# Patient Record
Sex: MALE | Race: WHITE | Employment: FULL TIME | URBAN - METROPOLITAN AREA
[De-identification: names, ages, dates, MRNs, and addresses within clinical notes are randomized per-mention and may not be internally consistent; named-entity substitution may affect disease eponyms.]

---

## 2021-12-02 ENCOUNTER — APPOINTMENT (OUTPATIENT)
Dept: GENERAL RADIOLOGY | Age: 36
End: 2021-12-02
Attending: EMERGENCY MEDICINE
Payer: COMMERCIAL

## 2021-12-02 ENCOUNTER — HOSPITAL ENCOUNTER (EMERGENCY)
Age: 36
Discharge: HOME | End: 2021-12-02
Attending: EMERGENCY MEDICINE
Payer: COMMERCIAL

## 2021-12-02 VITALS
DIASTOLIC BLOOD PRESSURE: 87 MMHG | SYSTOLIC BLOOD PRESSURE: 127 MMHG | HEART RATE: 76 BPM | BODY MASS INDEX: 20.46 KG/M2 | OXYGEN SATURATION: 95 % | HEIGHT: 68 IN | TEMPERATURE: 98.5 F | WEIGHT: 135 LBS | RESPIRATION RATE: 14 BRPM

## 2021-12-02 DIAGNOSIS — R07.89 CHEST WALL PAIN: Primary | ICD-10-CM

## 2021-12-02 DIAGNOSIS — M94.0 COSTOCHONDRITIS: ICD-10-CM

## 2021-12-02 LAB
ALBUMIN SERPL-MCNC: 4.4 G/DL (ref 3.5–5)
ALBUMIN/GLOB SERPL: 1.2 {RATIO} (ref 1.2–3.5)
ALP SERPL-CCNC: 65 U/L (ref 50–136)
ALT SERPL-CCNC: 36 U/L (ref 12–65)
ANION GAP SERPL CALC-SCNC: 7 MMOL/L (ref 7–16)
AST SERPL-CCNC: 18 U/L (ref 15–37)
BASOPHILS # BLD: 0.1 K/UL (ref 0–0.2)
BASOPHILS NFR BLD: 0 % (ref 0–2)
BILIRUB SERPL-MCNC: 0.4 MG/DL (ref 0.2–1.1)
BUN SERPL-MCNC: 15 MG/DL (ref 6–23)
CALCIUM SERPL-MCNC: 9.3 MG/DL (ref 8.3–10.4)
CHLORIDE SERPL-SCNC: 107 MMOL/L (ref 98–107)
CO2 SERPL-SCNC: 25 MMOL/L (ref 21–32)
CREAT SERPL-MCNC: 0.69 MG/DL (ref 0.8–1.5)
DIFFERENTIAL METHOD BLD: ABNORMAL
EOSINOPHIL # BLD: 0.1 K/UL (ref 0–0.8)
EOSINOPHIL NFR BLD: 0 % (ref 0.5–7.8)
ERYTHROCYTE [DISTWIDTH] IN BLOOD BY AUTOMATED COUNT: 12.1 % (ref 11.9–14.6)
GLOBULIN SER CALC-MCNC: 3.6 G/DL (ref 2.3–3.5)
GLUCOSE SERPL-MCNC: 109 MG/DL (ref 65–100)
HCT VFR BLD AUTO: 44.2 % (ref 41.1–50.3)
HGB BLD-MCNC: 14.8 G/DL (ref 13.6–17.2)
IMM GRANULOCYTES # BLD AUTO: 0 K/UL (ref 0–0.5)
IMM GRANULOCYTES NFR BLD AUTO: 0 % (ref 0–5)
LIPASE SERPL-CCNC: 84 U/L (ref 73–393)
LYMPHOCYTES # BLD: 2.2 K/UL (ref 0.5–4.6)
LYMPHOCYTES NFR BLD: 18 % (ref 13–44)
MAGNESIUM SERPL-MCNC: 2.3 MG/DL (ref 1.8–2.4)
MCH RBC QN AUTO: 30.1 PG (ref 26.1–32.9)
MCHC RBC AUTO-ENTMCNC: 33.5 G/DL (ref 31.4–35)
MCV RBC AUTO: 90 FL (ref 79.6–97.8)
MONOCYTES # BLD: 0.9 K/UL (ref 0.1–1.3)
MONOCYTES NFR BLD: 7 % (ref 4–12)
NEUTS SEG # BLD: 9.1 K/UL (ref 1.7–8.2)
NEUTS SEG NFR BLD: 74 % (ref 43–78)
NRBC # BLD: 0 K/UL (ref 0–0.2)
PLATELET # BLD AUTO: 319 K/UL (ref 150–450)
PMV BLD AUTO: 9.5 FL (ref 9.4–12.3)
POTASSIUM SERPL-SCNC: 3.7 MMOL/L (ref 3.5–5.1)
PROT SERPL-MCNC: 8 G/DL (ref 6.3–8.2)
RBC # BLD AUTO: 4.91 M/UL (ref 4.23–5.6)
SODIUM SERPL-SCNC: 139 MMOL/L (ref 138–145)
TROPONIN-HIGH SENSITIVITY: 3.3 PG/ML (ref 0–14)
TROPONIN-HIGH SENSITIVITY: <3 PG/ML (ref 0–14)
WBC # BLD AUTO: 12.3 K/UL (ref 4.3–11.1)

## 2021-12-02 PROCEDURE — 93005 ELECTROCARDIOGRAM TRACING: CPT | Performed by: EMERGENCY MEDICINE

## 2021-12-02 PROCEDURE — 85025 COMPLETE CBC W/AUTO DIFF WBC: CPT

## 2021-12-02 PROCEDURE — 96374 THER/PROPH/DIAG INJ IV PUSH: CPT

## 2021-12-02 PROCEDURE — 74011250636 HC RX REV CODE- 250/636: Performed by: EMERGENCY MEDICINE

## 2021-12-02 PROCEDURE — 94762 N-INVAS EAR/PLS OXIMTRY CONT: CPT

## 2021-12-02 PROCEDURE — 96375 TX/PRO/DX INJ NEW DRUG ADDON: CPT

## 2021-12-02 PROCEDURE — 71046 X-RAY EXAM CHEST 2 VIEWS: CPT

## 2021-12-02 PROCEDURE — 83690 ASSAY OF LIPASE: CPT

## 2021-12-02 PROCEDURE — 84484 ASSAY OF TROPONIN QUANT: CPT

## 2021-12-02 PROCEDURE — 99285 EMERGENCY DEPT VISIT HI MDM: CPT

## 2021-12-02 PROCEDURE — 83735 ASSAY OF MAGNESIUM: CPT

## 2021-12-02 PROCEDURE — 80053 COMPREHEN METABOLIC PANEL: CPT

## 2021-12-02 RX ORDER — SODIUM CHLORIDE 0.9 % (FLUSH) 0.9 %
5-10 SYRINGE (ML) INJECTION AS NEEDED
Status: DISCONTINUED | OUTPATIENT
Start: 2021-12-02 | End: 2021-12-03 | Stop reason: HOSPADM

## 2021-12-02 RX ORDER — PREDNISONE 50 MG/1
50 TABLET ORAL DAILY
Qty: 3 TABLET | Refills: 0 | Status: SHIPPED | OUTPATIENT
Start: 2021-12-02 | End: 2021-12-05

## 2021-12-02 RX ORDER — NAPROXEN 500 MG/1
500 TABLET ORAL 2 TIMES DAILY WITH MEALS
Qty: 20 TABLET | Refills: 0 | Status: SHIPPED | OUTPATIENT
Start: 2021-12-02 | End: 2021-12-12

## 2021-12-02 RX ORDER — DEXAMETHASONE SODIUM PHOSPHATE 100 MG/10ML
10 INJECTION INTRAMUSCULAR; INTRAVENOUS
Status: COMPLETED | OUTPATIENT
Start: 2021-12-02 | End: 2021-12-02

## 2021-12-02 RX ORDER — KETOROLAC TROMETHAMINE 30 MG/ML
30 INJECTION, SOLUTION INTRAMUSCULAR; INTRAVENOUS
Status: COMPLETED | OUTPATIENT
Start: 2021-12-02 | End: 2021-12-02

## 2021-12-02 RX ORDER — SODIUM CHLORIDE 0.9 % (FLUSH) 0.9 %
5-10 SYRINGE (ML) INJECTION EVERY 8 HOURS
Status: DISCONTINUED | OUTPATIENT
Start: 2021-12-02 | End: 2021-12-03 | Stop reason: HOSPADM

## 2021-12-02 RX ADMIN — SODIUM CHLORIDE 1000 ML: 900 INJECTION, SOLUTION INTRAVENOUS at 21:25

## 2021-12-02 RX ADMIN — DEXAMETHASONE SODIUM PHOSPHATE 10 MG: 10 INJECTION INTRAMUSCULAR; INTRAVENOUS at 21:25

## 2021-12-02 RX ADMIN — KETOROLAC TROMETHAMINE 30 MG: 30 INJECTION, SOLUTION INTRAMUSCULAR; INTRAVENOUS at 21:25

## 2021-12-02 NOTE — ED TRIAGE NOTES
Pt presents with left sided chest pain intermittent over the last several days. States today had an episode where he felt dizzy and his limbs were heavy.

## 2021-12-03 LAB
ATRIAL RATE: 113 BPM
CALCULATED P AXIS, ECG09: 70 DEGREES
CALCULATED R AXIS, ECG10: 80 DEGREES
CALCULATED T AXIS, ECG11: 55 DEGREES
DIAGNOSIS, 93000: NORMAL
P-R INTERVAL, ECG05: 184 MS
Q-T INTERVAL, ECG07: 322 MS
QRS DURATION, ECG06: 90 MS
QTC CALCULATION (BEZET), ECG08: 441 MS
VENTRICULAR RATE, ECG03: 113 BPM

## 2021-12-03 NOTE — ED NOTES
I have reviewed discharge instructions with the patient. The patient verbalized understanding. Patient left ED via Discharge Method: ambulatory to Home   Opportunity for questions and clarification provided. Patient given 0 scripts. To continue your aftercare when you leave the hospital, you may receive an automated call from our care team to check in on how you are doing. This is a free service and part of our promise to provide the best care and service to meet your aftercare needs.  If you have questions, or wish to unsubscribe from this service please call [phone]. Thank you for Choosing our [address] [place] [address] Emergency Department.

## 2021-12-03 NOTE — ED PROVIDER NOTES
J Luis Lancaster is a 31 y.o. male seen on 12/2/2021 in the [facility] EMERGENCY DEPT in room ER30/30. Chief Complaint   Patient presents with    Chest Pain     HPI:   19-year-old  male presented to the emergency department with complaints of intermittent left-sided chest pain that has been going on for the past few weeks. Patient denies any specific exacerbating or alleviating factors to his chest pain. He states that it is located in the left side of his chest just left of patient's sternum. He describes it as sharp in nature. It comes and goes. Patient did have an episode earlier today where he felt like his heart flutter and he subsequently became lightheaded and then started breathing fast and started feeling numbness and tingling in his fingers. 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 Patient does not have direct familial heart disease history however his grandparents did have heart disease. Patient denies any fevers, chills, nausea, vomiting, shortness of breath or any other concerns. Historian: Patient    REVIEW OF SYSTEMS     Review of Systems   Constitutional: Negative. HENT: Negative. Respiratory: Negative. Cardiovascular: Positive for chest pain and palpitations. Gastrointestinal: Negative. Genitourinary: Negative. Musculoskeletal: Negative. Skin: Negative. Neurological: Negative. Psychiatric/Behavioral: Negative. All other systems reviewed and are negative. PAST MEDICAL HISTORY     No past medical history on file. No past surgical history on file.   Social History     Socioeconomic History    Marital status: SINGLE     None     Allergies   Allergen Reactions    Ceclor [Cefaclor] Rash        PHYSICAL EXAM       Vitals:    12/02/21 2115 12/02/21 2130 12/02/21 2145 12/02/21 2200   BP: (!) 131/90 132/88 130/84 131/82   Pulse: 85 88 79 80   Resp: 17  18 16   Temp:       SpO2: 95% 97% 96% 99%    Vital signs were reviewed. Physical Exam  Vitals and nursing note reviewed. Constitutional:       General: He is not in acute distress. Appearance: He is well-developed. He is not ill-appearing or toxic-appearing. HENT:      Head: Normocephalic and atraumatic. Eyes:      Extraocular Movements: Extraocular movements intact. Pupils: Pupils are equal, round, and reactive to light. Cardiovascular:      Rate and Rhythm: Normal rate and regular rhythm. Heart sounds: Normal heart sounds. Pulmonary:      Effort: Pulmonary effort is normal.      Breath sounds: Normal breath sounds. Chest:      Chest wall: Tenderness (Point tenderness to the left sternocostal border of the mid to lower left chest) present. Abdominal:      Palpations: Abdomen is soft. There is no mass. Tenderness: There is no abdominal tenderness. Musculoskeletal:         General: Normal range of motion. Cervical back: Normal range of motion. Right lower leg: No tenderness. No edema. Left lower leg: No tenderness. No edema. Neurological:      General: No focal deficit present. Mental Status: He is alert and oriented to person, place, and time.    Psychiatric:         Mood and Affect: Mood normal.         Behavior: Behavior normal.          MEDICAL DECISION MAKING     ED Course:    Orders Placed This Encounter    XR Chest PA LAT (check if patient is in hallway or waiting room)    Troponin - High Sensitivity    Troponin 2 Hour Repeat    CBC    CMP    LIPASE    Magnesium    Cardiac Monitoring    PULSE OXIMETRY CONTINUOUS    NURSING-MISCELLANEOUS: Please draw blue top tube and send to lab ONE TIME    EKG    SALINE LOCK IV ONE TIME Routine    INSERT PERIPHERAL IV ONE TIME STAT    sodium chloride (NS) flush 5-10 mL    sodium chloride (NS) flush 5-10 mL    ketorolac (TORADOL) injection 30 mg    dexamethasone (DECADRON) 10 mg/mL injection 10 mg    sodium chloride 0.9 % bolus infusion 1,000 mL    naproxen (Naprosyn) 500 mg tablet    predniSONE (DELTASONE) 50 mg tablet     Recent Results (from the past 8 hour(s))   EKG, 12 LEAD, INITIAL    Collection Time: 12/02/21  6:51 PM   Result Value Ref Range    Ventricular Rate 113 BPM    Atrial Rate 113 BPM    P-R Interval 184 ms    QRS Duration 90 ms    Q-T Interval 322 ms    QTC Calculation (Bezet) 441 ms    Calculated P Axis 70 degrees    Calculated R Axis 80 degrees    Calculated T Axis 55 degrees    Diagnosis       Sinus tachycardia  Otherwise normal ECG  When compared with ECG of 02-DEC-2021 18:48,  No significant change was found     TROPONIN-HIGH SENSITIVITY    Collection Time: 12/02/21  7:09 PM   Result Value Ref Range    Troponin-High Sensitivity <3.0 0 - 14 pg/mL   CBC WITH AUTOMATED DIFF    Collection Time: 12/02/21  7:09 PM   Result Value Ref Range    WBC 12.3 (H) 4.3 - 11.1 K/uL    RBC 4.91 4.23 - 5.6 M/uL    HGB 14.8 13.6 - 17.2 g/dL    HCT 44.2 41.1 - 50.3 %    MCV 90.0 79.6 - 97.8 FL    MCH 30.1 26.1 - 32.9 PG    MCHC 33.5 31.4 - 35.0 g/dL    RDW 12.1 11.9 - 14.6 %    PLATELET 521 202 - 129 K/uL    MPV 9.5 9.4 - 12.3 FL    ABSOLUTE NRBC 0.00 0.0 - 0.2 K/uL    DF AUTOMATED      NEUTROPHILS 74 43 - 78 %    LYMPHOCYTES 18 13 - 44 %    MONOCYTES 7 4.0 - 12.0 %    EOSINOPHILS 0 (L) 0.5 - 7.8 %    BASOPHILS 0 0.0 - 2.0 %    IMMATURE GRANULOCYTES 0 0.0 - 5.0 %    ABS. NEUTROPHILS 9.1 (H) 1.7 - 8.2 K/UL    ABS. LYMPHOCYTES 2.2 0.5 - 4.6 K/UL    ABS. MONOCYTES 0.9 0.1 - 1.3 K/UL    ABS. EOSINOPHILS 0.1 0.0 - 0.8 K/UL    ABS. BASOPHILS 0.1 0.0 - 0.2 K/UL    ABS. IMM.  GRANS. 0.0 0.0 - 0.5 K/UL   METABOLIC PANEL, COMPREHENSIVE    Collection Time: 12/02/21  7:09 PM   Result Value Ref Range    Sodium 139 138 - 145 mmol/L    Potassium 3.7 3.5 - 5.1 mmol/L    Chloride 107 98 - 107 mmol/L    CO2 25 21 - 32 mmol/L    Anion gap 7 7 - 16 mmol/L    Glucose 109 (H) 65 - 100 mg/dL BUN 15 6 - 23 MG/DL    Creatinine 0.69 (L) 0.8 - 1.5 MG/DL    GFR est AA >60 >60 ml/min/1.73m2    GFR est non-AA >60 >60 ml/min/1.73m2    Calcium 9.3 8.3 - 10.4 MG/DL    Bilirubin, total 0.4 0.2 - 1.1 MG/DL    ALT (SGPT) 36 12 - 65 U/L    AST (SGOT) 18 15 - 37 U/L    Alk. phosphatase 65 50 - 136 U/L    Protein, total 8.0 6.3 - 8.2 g/dL    Albumin 4.4 3.5 - 5.0 g/dL    Globulin 3.6 (H) 2.3 - 3.5 g/dL    A-G Ratio 1.2 1.2 - 3.5     LIPASE    Collection Time: 12/02/21  7:09 PM   Result Value Ref Range    Lipase 84 73 - 393 U/L   MAGNESIUM    Collection Time: 12/02/21  7:09 PM   Result Value Ref Range    Magnesium 2.3 1.8 - 2.4 mg/dL   TROPONIN-HIGH SENSITIVITY    Collection Time: 12/02/21  9:31 PM   Result Value Ref Range    Troponin-High Sensitivity 3.3 0 - 14 pg/mL     XR CHEST PA LAT    Result Date: 12/2/2021  CHEST X-RAY, 2 views. INDICATION:  Chest pain. TECHNIQUE: PA and lateral views. COMPARISON: None. FINDINGS: Lungs: are clear. Costophrenic angles: are sharp. Heart size: is normal. Pulmonary vasculature: is unremarkable. Aorta: Unremarkable. Included portion of the upper abdomen: is unremarkable. Bones: No gross bony lesions. Other: None. Negative for acute abnormality. EKG interpretation personally: Rate 113. Sinus tachycardia. Normal RI and QT intervals. No ischemic changes. ED Course as of 12/02/21 2252   Thu Dec 02, 2021   2244 Patient is feeling much better. Troponins negative x2. Patient be discharged home. [JL]      ED Course User Index  [JL] DO HOUSTON Henley  Number of Diagnoses or Management Options  Diagnosis management comments: 30-year-old  male presented to emergency department with complaints of chest pain that been ongoing intermittently for the past few weeks. Patient's chest pain is consistent with musculoskeletal chest pain that is completely reproducible over the left sternocostal border.   When discussing patient's diagnosis he states that he is actually been sleeping on the floor for the past month and ordered a new mattress that comes tomorrow. This could be patient's etiology of his chest wall pain. Patient will be discharged home return department for any concerns. Amount and/or Complexity of Data Reviewed  Clinical lab tests: reviewed  Tests in the radiology section of CPT®: reviewed  Independent visualization of images, tracings, or specimens: yes    Patient Progress  Patient progress: improved        Disposition: Discharged  Diagnosis:     ICD-10-CM ICD-9-CM   1. Chest wall pain  R07.89 786.52   2. Costochondritis  M94.0 733.6     ____________________________________________________________________  A portion of this note was generated using voice recognition dictation software. While the note has been reviewed for accuracy, please note certain words and phrases may not be transcribed as intended and some grammatical and/or typographical errors may be present.

## 2022-09-26 ENCOUNTER — OFFICE VISIT (OUTPATIENT)
Dept: GASTROENTEROLOGY | Age: 37
End: 2022-09-26
Payer: COMMERCIAL

## 2022-09-26 VITALS
BODY MASS INDEX: 23.04 KG/M2 | HEIGHT: 68 IN | OXYGEN SATURATION: 94 % | WEIGHT: 152 LBS | SYSTOLIC BLOOD PRESSURE: 132 MMHG | HEART RATE: 81 BPM | DIASTOLIC BLOOD PRESSURE: 93 MMHG | TEMPERATURE: 97.2 F

## 2022-09-26 DIAGNOSIS — K52.9 CHRONIC DIARRHEA OF UNKNOWN ORIGIN: ICD-10-CM

## 2022-09-26 DIAGNOSIS — K52.9 CHRONIC DIARRHEA OF UNKNOWN ORIGIN: Primary | ICD-10-CM

## 2022-09-26 LAB
CRP SERPL-MCNC: <0.3 MG/DL (ref 0–0.9)
ERYTHROCYTE [SEDIMENTATION RATE] IN BLOOD: 5 MM/HR (ref 0–20)

## 2022-09-26 PROCEDURE — 99203 OFFICE O/P NEW LOW 30 MIN: CPT | Performed by: INTERNAL MEDICINE

## 2022-09-26 RX ORDER — FEXOFENADINE HYDROCHLORIDE 180 MG/1
TABLET, FILM COATED ORAL
COMMUNITY
Start: 2022-09-21

## 2022-09-26 ASSESSMENT — ENCOUNTER SYMPTOMS
COLOR CHANGE: 0
SHORTNESS OF BREATH: 0
TROUBLE SWALLOWING: 0
BLOOD IN STOOL: 0
VOMITING: 0
ABDOMINAL PAIN: 1
DIARRHEA: 1

## 2022-09-26 NOTE — PROGRESS NOTES
Ulises Dorsey (:  1985) is a 30 y.o. male, new patient here for evaluation of the following chief complaint(s):  Abdominal Pain (Sx for 2 months ), Other (Hungry at night time /Bloating for a while hungry but cant eat/ gas ), and Anemia (Dark [place] around eyes/ low energy )         ASSESSMENT/PLAN:  1. Chronic diarrhea of unknown origin  -     Calprotectin Stool; Future  -     Celiac Ab tTg DGP TIgA; Future  -     C-Reactive Protein; Future  -     Sedimentation Rate; Future  -     Ova and Parasite Examination; Future           Subjective   SUBJECTIVE/OBJECTIVE  Patient presents with recurrent episode of periumbilical abdominal discomfort moderate associated with change in bowel habits mostly on the looser diarrhea side. He said that he does a lot of traveling and his girlfriend had a parasitic infection. He denied any rectal bleeding fever chills weight loss. No family history of inflammatory bowel disease. He denied any food allergies. No past medical history on file. No past surgical history on file. Allergies   Allergen Reactions    Cefaclor Rash          Review of Systems   Constitutional:  Negative for appetite change. HENT:  Negative for mouth sores and trouble swallowing. Respiratory:  Negative for shortness of breath. Cardiovascular:  Negative for chest pain. Gastrointestinal:  Positive for abdominal pain and diarrhea. Negative for blood in stool and vomiting. Skin:  Negative for color change. Allergic/Immunologic: Negative for food allergies. Neurological:  Negative for seizures and weakness. Hematological:  Does not bruise/bleed easily. Objective   Physical Exam  HENT:      Head: Normocephalic. Mouth/Throat:      Mouth: Mucous membranes are moist.   Eyes:      General: No scleral icterus. Cardiovascular:      Rate and Rhythm: Normal rate and regular rhythm. Pulmonary:      Effort: No respiratory distress.    Abdominal:      General: There is no distension. Tenderness: There is no abdominal tenderness. There is no rebound. Lymphadenopathy:      Cervical: No cervical adenopathy. Skin:     Coloration: Skin is not jaundiced. Findings: No bruising. Neurological:      General: No focal deficit present. Mental Status: He is alert. Current Outpatient Medications   Medication Sig Dispense Refill    ALLERGY RELIEF 180 MG tablet TAKE 1 TABLET BY MOUTH TWICE DAILY       No current facility-administered medications for this visit. No family history on file. Return Await serology and stool sample studies. An electronic signature was used to authenticate this note.     --Sergo Posadas MD

## 2022-10-06 ENCOUNTER — TELEPHONE (OUTPATIENT)
Dept: GASTROENTEROLOGY | Age: 37
End: 2022-10-06

## 2022-10-06 LAB
CALPROTECTIN STL-MCNT: <16 UG/G (ref 0–120)
GLIADIN PEPTIDE IGA SER-ACNC: 4 UNITS (ref 0–19)
GLIADIN PEPTIDE IGG SER-ACNC: 3 UNITS (ref 0–19)
IGA SERPL-MCNC: NORMAL MG/DL
O+P SPEC MICRO: NORMAL
O+P STL CONC: NORMAL
SPECIMEN SOURCE: NORMAL
TTG IGA SER-ACNC: <2 U/ML (ref 0–3)
TTG IGG SER-ACNC: <2 U/ML (ref 0–5)

## 2022-10-06 NOTE — TELEPHONE ENCOUNTER
Patient called in to get result from specimen and labs, stool specimen results are still pending and only some labs are completed and resulted. Pt want his labs I informed pt that once they have resulted then Dr. Nathalie Wells will give the results, pt also stated that he was in pain and needed to got to urgent care. Advised pt that if he feels he needs to go that its best he goes.    TL

## 2022-10-07 ENCOUNTER — TELEPHONE (OUTPATIENT)
Dept: GASTROENTEROLOGY | Age: 37
End: 2022-10-07

## 2022-10-07 DIAGNOSIS — K52.9 CHRONIC DIARRHEA OF UNKNOWN ORIGIN: Primary | ICD-10-CM

## 2022-10-07 NOTE — TELEPHONE ENCOUNTER
Called pt and let him know that Dr. Jeanne Ferreira says all of his blood work came back normal and stool specimens were negative of parasites and ova. Pt would like results e-mailed to him.    TL

## 2022-10-17 DIAGNOSIS — K52.9 CHRONIC DIARRHEA OF UNKNOWN ORIGIN: ICD-10-CM

## 2022-10-20 LAB
GLIADIN PEPTIDE IGA SER-ACNC: 5 UNITS (ref 0–19)
GLIADIN PEPTIDE IGG SER-ACNC: 2 UNITS (ref 0–19)
IGA SERPL-MCNC: 214 MG/DL (ref 90–386)
TTG IGA SER-ACNC: <2 U/ML (ref 0–3)
TTG IGG SER-ACNC: <2 U/ML (ref 0–5)

## 2023-05-16 ENCOUNTER — TELEPHONE (OUTPATIENT)
Dept: GASTROENTEROLOGY | Age: 38
End: 2023-05-16

## 2023-05-16 NOTE — TELEPHONE ENCOUNTER
Sent medical records to [facility].  5/16/23 sent medical release signature form to  [facility]

## 2025-02-03 ENCOUNTER — OFFICE VISIT (OUTPATIENT)
Dept: ORTHOPEDIC SURGERY | Age: 40
End: 2025-02-03
Payer: COMMERCIAL

## 2025-02-03 ENCOUNTER — TELEPHONE (OUTPATIENT)
Dept: ORTHOPEDIC SURGERY | Age: 40
End: 2025-02-03

## 2025-02-03 DIAGNOSIS — M25.512 ACUTE PAIN OF LEFT SHOULDER: Primary | ICD-10-CM

## 2025-02-03 DIAGNOSIS — S46.002A ROTATOR CUFF INJURY, LEFT, INITIAL ENCOUNTER: ICD-10-CM

## 2025-02-03 PROCEDURE — 99204 OFFICE O/P NEW MOD 45 MIN: CPT | Performed by: STUDENT IN AN ORGANIZED HEALTH CARE EDUCATION/TRAINING PROGRAM

## 2025-02-03 NOTE — PROGRESS NOTES
Name: Kelvin Delacruz  YOB: 1985  Gender: male  MRN: [id number]  Date of Encounter:  2/3/2025       CHIEF COMPLAINT:     Chief Complaint   Patient presents with    Shoulder Pain     Left        SUBJECTIVE/OBJECTIVE:      HPI:    Kelvin Delacruz  is a 40 y.o. pleasant male who presents today for a new evaluation of his right shoulder pain.    He presents for left shoulder pain that has been present since the fall after a Cross[employer] injury.  He recalls doing overhead lifting exercises and felt acute pain in the shoulder.  That was his last CrossFit class.  He has tried to get back to the weight room but has not been able to do any shoulder exercises without pain and weakness.  He generally has pain to the anterior and lateral shoulder slightly down the arm.  He has occasionally noted some numbness but cannot recall any inciting event to that.  He has tried home remedies without improvement.  He is right-hand dominant.     PAST HISTORY:   Past medical, surgical, family, social history and allergies reviewed by me.   Tobacco use:  has no history on file for tobacco use.  Diabetes: none  No results found for: \"LABA1C\"      REVIEW OF SYSTEMS:   As noted in HPI.     PHYSICAL EXAMINATION:     Gen: Well-developed, no acute distress   HEENT: NC/AT, EOMI   Neck: Trachea midline, normal ROM   CV: Regular rhythm by palpation of distal pulse, normal capillary refill   Pulm: No respiratory distress, no stridor   Psychiatric: Well oriented, normal mood and affect.   Skin: No rashes, lesions or ulcers, normal temperature, turgor, and texture on uninvolved extremity.      ORTHO EXAM:    Left Shoulder:     Inspection: no biceps deformity; no notable atrophy or erythema  ROM active  passive:   180. Abduction 170  170.  Ex rotation symmetric. Int rotation: lumbar spine.  Tenderness: No tenderness  Strength: Weakness with resisted abduction, 3+/5.  External rotation 5/5, Internal rotation 5/5  Provocative

## 2025-02-03 NOTE — TELEPHONE ENCOUNTER
MRI LEFT SHOULDER APPROVAL     Order Request Summary   Health Plan:  [employer]  Scheduled Date of Service:  2/17/2025            Order ID: [id number]         Authorized    Approval Valid Through: 02/03/2025 - 03/04/2025       This order is not a guarantee of payment except when required by applicable law. When applicable law allows, payment is subject to the member's active enrollment, benefit limitation and other terms of the member's contract at the time of services provided.               Member Information:  MARTINEZ LAURENT  Member #: [id number]  [address]  [place] , [place] [id number]  YOB: 1985  Phone: [phone] Ordering Provider:   YUNIOR ROTHMAN  [address] [place] [address]  Phone: [phone]    NPI: [id number]  Servicing Provider:      GARFIELD COOLEY [facility]  [address] [place] [address]  Phone: [phone]  Fax:  NPI: [id number]  TIN:        
Spontaneous, unlabored and symmetrical

## 2025-02-10 ENCOUNTER — OFFICE VISIT (OUTPATIENT)
Dept: ORTHOPEDIC SURGERY | Age: 40
End: 2025-02-10
Payer: COMMERCIAL

## 2025-02-10 DIAGNOSIS — M47.812 CERVICAL SPONDYLOSIS: Primary | ICD-10-CM

## 2025-02-10 PROCEDURE — 99204 OFFICE O/P NEW MOD 45 MIN: CPT | Performed by: PHYSICIAN ASSISTANT

## 2025-02-10 NOTE — PROGRESS NOTES
Negative        Sensory    Sensation to light touch intact C5-T1      Motor    Deltoids: grossly normal    Biceps:  grossly normal    Triceps: grossly normal  Wrist extension/flexion: grossly normal    I/O:  grossly normal    : grossly normal     Point tenderness: No significant    Gait: Normal              IMAGING:     MRI Result (most recent):  No results found for this or any previous visit from the past 3650 days.        AP and lateral views of the cervical spine reveal fair alignment.  Mild loss of lordosis.  Mild multilevel cervical spondylosis with spurring and mild loss of disc height at C6-7          ASSESSMENT AND PLAN:   The patient has primarily right-sided neck pain and posterior shoulder pain without significant radicular or myelopathic features.  I recommend referral to physical therapy for neck exercises.  We also discussed a trial of tizanidine and an over-the-counter NSAID regimen as needed.  We will hold off on additional medications for now and we will just start with physical therapy.  Plan to follow-up in 2 months for recheck of his symptoms, if he fails to get meaningful improvement I recommend an MRI of the cervical spine for further evaluation.      4--this is a chronic illness/condition with exacerbation

## 2025-02-26 NOTE — PROGRESS NOTES
Name: Kelvin Delacruz  YOB: 1985  Gender: male  MRN: [id number]      CC: Results (Left Shoulder MRI)       HPI: Kelvin Delacruz is a 40 y.o. male who returns for follow up and MRI results of the right shoulder.     Recall Hx:   He presents for left shoulder pain that has been present since a Cross[employer] injury last year. He recalls doing overhead lifting exercises and felt acute pain in the shoulder. That was his last CrossFit class. He has tried to get back to the weight room but has not been able to do any shoulder exercises without pain and weakness. He generally has pain to the anterior and lateral shoulder slightly down the arm. He has occasionally noted some numbness but cannot recall any inciting event to that. He has tried home remedies without improvement. He is right-hand dominant.     2/3/25: Initial eval. MRI ordered.    2/27/25: MRI results today.    Physical Examination:  General: no acute distress, well appearing  Lungs: no respiratory distress or stridor   CV: regular rhythm by pulse, normal capillary refill    Left Shoulder:      Inspection: no biceps deformity; no notable atrophy or erythema  ROM active  passive:   180. Abduction 170  170.  Ex rotation symmetric. Int rotation: lumbar spine.  Tenderness: No tenderness  Strength: Weakness with resisted abduction, 3+/5.  External rotation 5/5, Internal rotation 5/5  Provocative tests: Negative Belly press, bearhug.  Positive empty can and full can, negative Franco.  There is normal capillary refill / 2+ radial pulse   Sensation intact to light touch          Imaging:     I independently interpreted the MRI I ordered of the left shoulder    Independent review findings:     Mild AC joint arthritis with mild joint edema.  Glenohumeral joint cartilage intact without significant bony changes.  There is mild subacromial subdeltoid bursitis noted.  Biceps tendon without discrete tear, appropriately positioned in the groove.

## 2025-02-27 ENCOUNTER — OFFICE VISIT (OUTPATIENT)
Dept: ORTHOPEDIC SURGERY | Age: 40
End: 2025-02-27

## 2025-02-27 DIAGNOSIS — M75.52 BURSITIS OF LEFT SHOULDER: Primary | ICD-10-CM

## 2025-02-27 DIAGNOSIS — M75.82 ROTATOR CUFF TENDONITIS, LEFT: ICD-10-CM

## 2025-02-27 RX ORDER — METHYLPREDNISOLONE ACETATE 40 MG/ML
40 INJECTION, SUSPENSION INTRA-ARTICULAR; INTRALESIONAL; INTRAMUSCULAR; SOFT TISSUE ONCE
Status: COMPLETED | OUTPATIENT
Start: 2025-02-27 | End: 2025-02-27

## 2025-02-27 RX ADMIN — METHYLPREDNISOLONE ACETATE 40 MG: 40 INJECTION, SUSPENSION INTRA-ARTICULAR; INTRALESIONAL; INTRAMUSCULAR; SOFT TISSUE at 10:50
